# Patient Record
(demographics unavailable — no encounter records)

---

## 2025-04-30 NOTE — ASSESSMENT
[FreeTextEntry1] : Rosacea Education. Metrogel bid. Use doxy 50 mg bid. Oil free preps for the face. f/u in 1-2 months.  Xerosis, UE's. Emollients.

## 2025-04-30 NOTE — PHYSICAL EXAM
[Alert] : alert [Oriented x 3] : ~L oriented x 3 [Well Nourished] : well nourished [FreeTextEntry3] : Erythematous pustules of the right cheek.    Xerosis of the UE's.

## 2025-04-30 NOTE — HISTORY OF PRESENT ILLNESS
[FreeTextEntry1] : Patient for rash of the right cheek.  Fit in today. [de-identified] : She notes progressive over the past months.

## 2025-05-13 NOTE — PHYSICAL EXAM
[FreeTextEntry3] : AAOx3, pleasant, NAD, no visual lymphadenopathy hair, scalp, face, nose, eyelids, ears, lips, oropharynx, neck, chest, abdomen, back, right arm, left arm, nails, and hands examined with all normal findings, pertinent findings include:  keloid central chest; foreign body (suture on left end) + hair pull test

## 2025-05-13 NOTE — HISTORY OF PRESENT ILLNESS
[FreeTextEntry1] : keloid forming [de-identified] : 67 year old female with keloid forming in area of BCC excision. hair loss. had covid dec 2022. had bag of hair 6 visits ago. developing new bumpiness in area.

## 2025-05-13 NOTE — ASSESSMENT
[FreeTextEntry1] : keloid Risks and benefits were discussed including including atrophy, discoloration Intralesional triamcinolone, concentration   5 mg/cc; Total volume    0.4  cc Sites: 4  Risks and benefits of Pulsed Dye Laser Therapy were discussed including bruising, swelling, discoloration, lack of response, and need for multiple treatments; Pulsed Dye Laser :  Handpiece, 5 mm 10 Joules 1.5 msec; Site- chest   telogen effluvium education and prognosis  plan for CO2 on f/u; patient feels sutures did not dissolve

## 2025-07-08 NOTE — ASSESSMENT
[FreeTextEntry1] : keloid Risks and benefits were discussed including including atrophy, discoloration Intralesional triamcinolone, concentration   5 mg/cc; Total volume    0.4  cc Sites: 4  telogen effluvium education and prognosis  no new sutures in area per patient  SK education

## 2025-07-08 NOTE — HISTORY OF PRESENT ILLNESS
[FreeTextEntry1] : keloid forming [de-identified] : 67 year old female with keloid forming in area of BCC excision. hair loss. had covid dec 2022. had bag of hair 7visits ago. developing new bumpiness in area. spot on left lower back

## 2025-07-08 NOTE — PHYSICAL EXAM
[FreeTextEntry3] : AAOx3, pleasant, NAD, no visual lymphadenopathy hair, scalp, face, nose, eyelids, ears, lips, oropharynx, neck, chest, abdomen, back, right arm, left arm, nails, and hands examined with all normal findings, pertinent findings include:  keloid central chest; + hair pull test Scaling waxy stuck on papule on left lower back